# Patient Record
Sex: MALE | Race: BLACK OR AFRICAN AMERICAN | URBAN - METROPOLITAN AREA
[De-identification: names, ages, dates, MRNs, and addresses within clinical notes are randomized per-mention and may not be internally consistent; named-entity substitution may affect disease eponyms.]

---

## 2019-02-11 PROBLEM — I10 BENIGN ESSENTIAL HYPERTENSION: Status: ACTIVE | Noted: 2018-06-07

## 2019-02-11 PROBLEM — F14.10 COCAINE ABUSE (HCC): Status: ACTIVE | Noted: 2018-06-07

## 2019-02-11 PROBLEM — R63.4 UNEXPLAINED WEIGHT LOSS: Status: ACTIVE | Noted: 2018-06-08

## 2019-02-11 PROBLEM — F31.9 BIPOLAR DISORDER (HCC): Status: ACTIVE | Noted: 2017-12-21

## 2019-02-11 PROBLEM — F20.9 SCHIZOPHRENIA (HCC): Status: ACTIVE | Noted: 2017-12-21

## 2019-02-11 PROBLEM — E78.00 PURE HYPERCHOLESTEROLEMIA: Status: ACTIVE | Noted: 2018-06-07

## 2021-12-24 ENCOUNTER — HOME HEALTH ADMISSION (OUTPATIENT)
Dept: HOME HEALTH SERVICES | Facility: HOME HEALTH | Age: 66
End: 2021-12-24
Payer: MEDICARE

## 2021-12-26 ENCOUNTER — HOME CARE VISIT (OUTPATIENT)
Dept: SCHEDULING | Facility: HOME HEALTH | Age: 66
End: 2021-12-26
Payer: MEDICARE

## 2021-12-26 PROCEDURE — 3331090001 HH PPS REVENUE CREDIT

## 2021-12-26 PROCEDURE — 400013 HH SOC

## 2021-12-26 PROCEDURE — 3331090002 HH PPS REVENUE DEBIT

## 2021-12-26 PROCEDURE — 400018 HH-NO PAY CLAIM PROCEDURE

## 2021-12-26 PROCEDURE — G0299 HHS/HOSPICE OF RN EA 15 MIN: HCPCS

## 2021-12-26 NOTE — HOME HEALTH
Skilled Reason for admission/summary of clinical condition:  79-year old male who was recently hospitalized due to Prostate Cancer. Patient states that his legs and back hurt at times, uses Lidoderm patches to lower back daily and has Perocet or Tylenol for pain control. Patient stays in wheelchair most of the day. He states that he \"just wants to be able to walk and get around again. \"  Skilled nursing in for disease/medication management and education regarding Prostate Cancer-pain control, fall precautions, monitoring medication compliance. Patient had fall about a month ago when weakness started prior to going to hospital.    This patient is homebound for the following reasons Requires the assistance of 1 or more persons to leave the home  and Only leaves the home for medical reasons or [employer] services and are infrequent and of short duration for other reasons . Caregiver: Nephew and daughter. Caregiver assists with ADL's, medications, meal preparation, grocery shopping, transportation to MD appointments. Medications reconciled and all medications are not available in the home this visit. Patient's grandson to  prescriptions today. The following education was provided regarding medications, medication interactions, and look alike medications (specify): side effects, purposes, dosages, frequencies. Medications are effective at this time. High risk medication teaching regarding anticoagulants, hyperglycemic agents or opiod narcotics performed (specify) Percocet-pain medication-take only as prescribed, monitor for signs of dizziness/constipation/sedation that could occur with use, notify agency/MD if ineffective for pain. Home health supplies by type and quantity ordered/delivered this visit include: None needed.   Patient education provided this visit to include:   INSTRUCTED PATIENT AND CG THAT SHOULD ANY NEEDS OR CONCERNS ARISE TO FIRST CALL OUR OFFICE, OR THE DR'S OFFICE  OR GO TO AN URGENT CARE CENTER AND NOT TO THE ED FOR NON-LIFE THREATENING EVENTS. IF IT IS LIFE THREATENING THEN CALL 911 OR GO TO THE CLOSEST ER. Patient is a fall risk. Educated pateint to sit on the side of the chair/bed, take a slow deep breaths, have feet firmly planted before standing up, use cane/walker if available, or have someone to assist.  patient instructed to maintain clear pathways in home and to minimize clutter to prevent falls from occurring/minimize fall potential.  Pt/cg aware of disease process, s/s to monitor for, who to report to/when.  goals/teaching intiated. unable to assess progression of goals at this time due to being admission visit.  encouraged patient to get three nutritional meals daily and to stay hydrated, drink plenty of fluids. Patient/caregiver degree of understanding:fair  Home exercise program/Homework provided:   Pt/Caregiver instructed on plan of care and are agreeable to plan of care at this time. Physician Sol Moeller MD notified of patient admission to home health and plan of care including anticipated frequency of 2W1, 1W2, 2 PRN and treatments/interventions/modalities of disease/medication management and education regarding Prostate Cancer-pain control, fall precautions, monitoring medication compliance. Discharge planning discussed with patient and caregiver. Discharge planning as follows: Patient will be discharged once education has completed, patient is medically stable and pt/cg are able to independently manage medications and disease process. Pt/Caregiver did verbalize understanding of discharge planning. Next MD appointment TBD, patient's family to make appointment. Patient/caregiver encouraged/instructed to keep appointment as lack of follow through with physician appointment could result in discontinuation of home care services for non-compliance.     Prior to the visit, the patient was screened for the followin.) International travel in the last month: NO  2.) Exposure Screening:  Contact with anyone with the following symptoms in the last 14 days:    a.) Fever, or lower respiratory illness (shortness of breath or cough) or NO  b.) Fever with severe acute respiratory illness (pneumonia or acute respiratory distress syndrome) NO   3.) The patient has the following symptoms:  a.) Fever, cough, shortness of breath NO  If the patient has the above symptoms: The home health attending or hospice medical director will be contacted and scheduled patient visits will be placed on hold until the patient is confirmed to be positive or negative. An order will be obtained to hold visits until test results are confirmed. If negative, patient visits will resume. If positive, a delay in care order will be obtained as well as orders to restart care after the 14 day isolation period. During the 14 day isolation period the patient will be contacted by their primary  2 times a week to check on patient status and to make sure they have no questions about medications, disease process, and that they have had no changes in condition. The patient will be instructed to contact home health or hospice during the delay of care for any changes in conditions, questions, or concerns.

## 2021-12-27 ENCOUNTER — HOME CARE VISIT (OUTPATIENT)
Dept: HOME HEALTH SERVICES | Facility: HOME HEALTH | Age: 66
End: 2021-12-27
Payer: MEDICARE

## 2021-12-27 PROCEDURE — 3331090001 HH PPS REVENUE CREDIT

## 2021-12-27 PROCEDURE — 3331090002 HH PPS REVENUE DEBIT

## 2021-12-28 ENCOUNTER — HOME CARE VISIT (OUTPATIENT)
Dept: HOME HEALTH SERVICES | Facility: HOME HEALTH | Age: 66
End: 2021-12-28
Payer: MEDICARE

## 2021-12-28 VITALS
SYSTOLIC BLOOD PRESSURE: 116 MMHG | TEMPERATURE: 98 F | RESPIRATION RATE: 20 BRPM | OXYGEN SATURATION: 98 % | DIASTOLIC BLOOD PRESSURE: 66 MMHG | HEART RATE: 78 BPM

## 2021-12-28 PROCEDURE — 3331090001 HH PPS REVENUE CREDIT

## 2021-12-28 PROCEDURE — 3331090002 HH PPS REVENUE DEBIT

## 2021-12-28 NOTE — CASE COMMUNICATION
Hi Dr. Nasrin Nava,      I just wanted to make you aware that Mr. Carlee Nath has been admitted to home health care services with skilled nursing in for disease/medication management and education regarding Prostate Cancer-pain control, fall precautions, monitoring medication compliance. Patient did not have new medications at time of visit, but was going to  today. Thank you for allowing [facility] to care for your patient .     Thank you,  Farhan Florian RN

## 2021-12-29 ENCOUNTER — HOME CARE VISIT (OUTPATIENT)
Dept: SCHEDULING | Facility: HOME HEALTH | Age: 66
End: 2021-12-29
Payer: MEDICARE

## 2021-12-29 ENCOUNTER — HOME CARE VISIT (OUTPATIENT)
Dept: HOME HEALTH SERVICES | Facility: HOME HEALTH | Age: 66
End: 2021-12-29
Payer: MEDICARE

## 2021-12-29 VITALS
DIASTOLIC BLOOD PRESSURE: 62 MMHG | TEMPERATURE: 98.2 F | HEART RATE: 99 BPM | OXYGEN SATURATION: 98 % | SYSTOLIC BLOOD PRESSURE: 129 MMHG

## 2021-12-29 PROCEDURE — G0152 HHCP-SERV OF OT,EA 15 MIN: HCPCS

## 2021-12-29 PROCEDURE — G0155 HHCP-SVS OF CSW,EA 15 MIN: HCPCS

## 2021-12-29 PROCEDURE — 3331090002 HH PPS REVENUE DEBIT

## 2021-12-29 PROCEDURE — 3331090001 HH PPS REVENUE CREDIT

## 2021-12-29 NOTE — Clinical Note
Therapy Functional Score Assessment  Question                                            Score   Grooming                            2       Upper Dressing   2      Lower Dressing   2      Bathing                 5      Toilet Transfer                   4    Transfer                2            Ambulation                         4   Dyspnea                     2       Pain Interfering with activity        0  Est number therapy visits      5

## 2021-12-29 NOTE — HOME HEALTH
MSW provided resource information and documentation to pt and his nephew/caregiver Ashley Nicholas, who agreed to share information with the pt's daughter/payee Wendy Dhillon. Pt would benefit from additional care assistance as his primary caregiver is employed. MSW invited a return call if resource questions arise.

## 2021-12-29 NOTE — HOME HEALTH
Caregiver involvement: Pt lives with his nephew who assists with ADLs and IADLs. He is able to get around the house using a manual wheelchair but unable to ambulated since later October 2021. He has a tub shower that he is unable to use because of weakness in B LE inhibiting his ability to step into tub. Medications reconciled and all medications are available in the home this visit. The following education was provided regarding medications,: Continue as directed by MD.  Medications  are effective at this time. Patient education provided this visit: Educated on use of wheelchair brakes. Pt educated to lock breaks prior to completing transfers of seated ADL tasks like donning/doffing shoes for safety. Pt educated on clearing walk ways of clutter, throw rugs and securing marika to reduce fall hazards in the home. Pt and his nephew educated on tub shower bench option for increasing pt ability to showering in the tub shower unit safely. Patient level of understanding of education provided: Pt will continue to require reminders for safety with proper wheelchair break use with baseline cognitive delays. His nephew was present throughout and agreeable to provide safety cues. Pt and his nephew able to teach back benefits of tub transfer bench for increased independence with bathing. Patient response to treatment: Pt able to complete toilet transfer with CGA using counter for support. He expressed no fatigue when asked but presented with slight dyspnea after standing mobility. His legs buckled when attempting to left up high enough to clear tub and was unsafe for completion of tub transfer trial. He was able to deidra/doff his shoes including laces with cues for maintaining attention to tasks. He was able to lock his wheelchair breaks x2 without cues during transfers after initial verbal education.      Skilled Care Provided: Pt completed full occupational therapy assessment to include balance, coordination, strengthening, ADL education/training, functional mobility and home safety. Progress toward goals: Mr. Taylor Munoz presents with good  therapy potential to increase his endurance, balance, ROM, safety awareness and strength to increase pt independence with ADLs and functional mobility. He currently requires supervision to minimal assist with ADL tasks and compete assist with IADLS (cooking, cleaning, medication management. He presents with decreased standing balance and endurance for safe bathroom mobility. He is currently using a wheelchair in the home with walker to arrive this week. He has strong motivation to increase is independence with standing functional mobility. Pt and his nephew agreeable to continued occupational therapy services to address balance, endurance, B UE strength, ADLs, and functional mobility. Pt nephew reports pt to stay with him once discharged and not to boarding home. Home exercise program: Increase awareness of safety with locking breaks prior to functional activities. Pt to also be aware of fall hazards prior to completing transfers. CONTINUED NEED FOR THE FOLLOWING SKILLS: [facility] OT is medically necessary to address barriers of weakness, impaired balance,and decreased endurance. These barriers limit pt's participation in ADLs and functional mobility safely and would benefit from continued skilled OT to maximize independence and reduce burden on caregiver. Continued need for the following skills: Nursing, Physical Therapy, Occupational Therapy, Medical Social Work and [address] [facility]    The following discharge planning was discussed with the pt/caregiver: 1w5 B UE strengthen, bathing/dressing, balance and shower transfers. Pt to discharge with nephew    Gala Davey is a 60year old male with a past medical history of schizophrenia and metastatic prostate cancer with wide spread osseous metastatic disease.  The following information was obtained by the nephew at bedside and history: Ham Borrero was treated with palliative radiation 10/10 and androgen deprivation therapy was started in 10/2020. He was due to follow up outpatient for discussion of possible chemotherapy. However, he was lost as an outpatient to follow up. Per the nephew, Ham Borrero was placed in a boarding home and stopped following up. Per the nephew, he often refuses treatment of any kind. He does have a history of schizophrenia and is treated at [facility] with Anastasiia Murillo. Ham Borrero moved out of the boarding home in with his nephew approximately 1.5 months ago. Since that time his nephew has noticed a dramatic decline in his health. In particular over the last 3-5 days, Ham Borrero has been unable to ambulate. His nephew has to carry him to the chair and bathro om. He has also had some Incontinence of urine and bowel over the last four days. His nephew has noticed a cough and some dyspnea with exertion. Ham Borrero denies any pain at this time. He does endorse a cough. He is oriented to person and place only. Per the nephew, he has noticed Increased confusion at home. He is a previous smoker. He smoked cigarettes for 40+ years until he was unable to get out of the house approximately 1. 5 weeks ago.

## 2021-12-30 PROCEDURE — 3331090001 HH PPS REVENUE CREDIT

## 2021-12-30 PROCEDURE — 3331090002 HH PPS REVENUE DEBIT

## 2021-12-31 ENCOUNTER — HOME CARE VISIT (OUTPATIENT)
Dept: HOME HEALTH SERVICES | Facility: HOME HEALTH | Age: 66
End: 2021-12-31
Payer: MEDICARE

## 2021-12-31 ENCOUNTER — HOME CARE VISIT (OUTPATIENT)
Dept: SCHEDULING | Facility: HOME HEALTH | Age: 66
End: 2021-12-31
Payer: MEDICARE

## 2021-12-31 VITALS
OXYGEN SATURATION: 97 % | SYSTOLIC BLOOD PRESSURE: 148 MMHG | HEART RATE: 105 BPM | TEMPERATURE: 98.9 F | DIASTOLIC BLOOD PRESSURE: 88 MMHG | RESPIRATION RATE: 16 BRPM

## 2021-12-31 PROCEDURE — 3331090001 HH PPS REVENUE CREDIT

## 2021-12-31 PROCEDURE — 3331090002 HH PPS REVENUE DEBIT

## 2021-12-31 PROCEDURE — G0299 HHS/HOSPICE OF RN EA 15 MIN: HCPCS

## 2021-12-31 NOTE — HOME HEALTH
Skilled reason for visit: education regarding disease and medications     Caregiver involvement: family . Medications reviewed and all medications are not available in the home this visit. The following education was provided regarding medications, medication interactions, and look alike medications (specify): unable to find medications patient did not know where they where and CG was not present . Medications  are effective at this time.       Home health supplies by type and quantity ordered/delivered this visit include: none    Patient education provided this visit: SN educated on need to get medications and let nurse know what he is taking, making sure that he is rotating off his backside to help relieve pressure     Patient level of understanding of education provided: verbalized understanding but unable to teach back    Patient response to procedure performed:  no procedure     Patient's Progress towards personal goals: patient stated he wants to wlak again     Home exercise program: breathing techqnieus     Continued need for the following skills: Nursing, Physical Therapy and Occupational Therapy    Plan for next visit: education     Patient and/or caregiver notified and agrees to changes in the Plan of Care N/A      The following discharge planning was discussed with the pt/caregiver: when goals met and education is complete

## 2022-01-01 PROCEDURE — 3331090001 HH PPS REVENUE CREDIT

## 2022-01-01 PROCEDURE — 3331090002 HH PPS REVENUE DEBIT

## 2022-01-02 PROCEDURE — 3331090002 HH PPS REVENUE DEBIT

## 2022-01-02 PROCEDURE — 3331090001 HH PPS REVENUE CREDIT

## 2022-01-03 ENCOUNTER — HOME CARE VISIT (OUTPATIENT)
Dept: HOME HEALTH SERVICES | Facility: HOME HEALTH | Age: 67
End: 2022-01-03
Payer: MEDICARE

## 2022-01-03 ENCOUNTER — HOME CARE VISIT (OUTPATIENT)
Dept: SCHEDULING | Facility: HOME HEALTH | Age: 67
End: 2022-01-03
Payer: MEDICARE

## 2022-01-03 VITALS
OXYGEN SATURATION: 97 % | TEMPERATURE: 98 F | SYSTOLIC BLOOD PRESSURE: 110 MMHG | DIASTOLIC BLOOD PRESSURE: 70 MMHG | RESPIRATION RATE: 16 BRPM | HEART RATE: 95 BPM

## 2022-01-03 PROCEDURE — 3331090001 HH PPS REVENUE CREDIT

## 2022-01-03 PROCEDURE — 3331090002 HH PPS REVENUE DEBIT

## 2022-01-03 PROCEDURE — G0151 HHCP-SERV OF PT,EA 15 MIN: HCPCS

## 2022-01-03 NOTE — HOME HEALTH
PT INITIAL EVALUATION  Ingrid Ch is a 60year old male with a past medical history of schizophrenia and metastatic prostate cancer with wide spread osseous metastatic disease. The following information was obtained by the nephew at bedside and history: Jessie Joyce was treated with palliative radiation 10/10 and androgen deprivation therapy was started in 10/2020. He was due to follow up outpatient for discussion of possible chemotherapy. However, he was lost as an outpatient to follow up. Per the nephew, Jessie Joyce was placed in a boarding home and stopped following up. Per the nephew, he often refuses treatment of any kind. He does have a history of schizophrenia and is treated at [facility] with Lv Thurman. Jessie Joyce moved out of the boarding home in with his nephew approximately 1.5 months ago. Since that time his nephew has noticed a dramatic decline in his health. In particular over the last 3-5 days, Jessie Joyce has been unable to ambulate. His nephew has to carry him to the chair and bathro om. He has also had some Incontinence of urine and bowel over the last four days. His nephew has noticed a cough and some dyspnea with exertion. Jessie Joyce denies any pain at this time. He does endorse a cough. He is oriented to person and place only. Per the nephew, he has noticed Increased confusion at home. He is a previous smoker. He smoked cigarettes for 40+ years until he was unable to get out of the house approximately 1. 5 weeks ago. Hospital Course: This is a 60year old male with extensively metastatic prostate cancer presenting with generalized decline, weakness, and hypercalcemia. He was treated with calcitonin and IVF with correction of his hypercalcemia. He was seen by oncology and started on casodex. He is felt not to be a good candidate for any aggressive chemotherapy, nor does he want that. He will follow up with oncology in the office.      Past Medical Hx:   Weakness, Debility    Metastatic prostate CA, Hypercalcemia    Hypomagnesemia    FTT, Anorexia    Anemia    Schizophrenia    Medication Management: Nephew manages his medications  Social hx and home eval:  Pt lives in single story home with nephew. Caregiver Involvement: assists with ADL's, medical appointments  PLOF:  Pt PLOF is ambulation w no AD, pts base level of function independent with all ADL's  BALANCE:     Seated unsupported balance is good   Standing static balance is fair-  Standing dynamic balance is poor  Tinetti 13 /28    Patient is at risk for falls due to impaired balance, recent hospitalization  BLE Strength:  Left Hip flexion 3-/5 , hip abduction 3-/5, hip adduction 3-/5, Knee flexion 3/5  knee extension 3/5, ankle dorsiflexion 3/5  Right Hip flexion 3-/5 , hip abduction 3-/5, hip adduction 3-/5, Knee flexion 3/5  knee extension 3/5, ankle dorsiflexion 3/5  BLE ROM:  Right hip/knee/ankle: WFL  Left hip/knee/ankle: WFL  Bed mobility:  min A   Transfers:  mod A with sit<->stand for bed to chair, with PUW AD. min cues and instruction needed for safety and coordination  GAIT:  Patient ambulated  50 ft  with PUW  on level  surfaces min A. Pt demonstrates with decreased hip and knee flexion on BLE in pre and mid swing phase of gait as well as decreased stride-length and rich. Patient is unable to safely ambulate without assistance at this time. Pt required min cues for  safety and sequencing  Stairs: NT  Patient education provided this visit: Safety with functional mobility and instruction with HEP. Assessment: Referral for HHPT following recent hospitalization due to prostate cancer. HHPT is medically necessary to address the following clinical findings: decreased BLE strength and ROM, impaired gait, decreased I and safety with transfers and gait, decreased endurance, decreased balance and decreased safety in order to improve functional mobility and decrease fall risk. Pt is a fall risk as indicated by Tinetti score of 13/28. Patient will be seen for HHPT 3w1, 2w4, 1w1 for therapeutic exercises to increase BLE strength and ROM,  training for gait, stairs and transfers to increase I and safety with daily functional mobility and balance and endurance activities to decrease fall risk, decrease impairment and increase functional mobility and independence in the home. Pt. requires skilled PT intervention to instruct Pt. with gait training with LRAD, ROM and strengthening, stair training, transfer training, balance training, manual therapy, neuromuscular re-education, patient care-giver education, HEP, endurance training, body mechanics. Instructed patient with HEP for BLE/core strengthening and left HEP handout for the patient. Patient was able to return demonstrate the exercises given.   HEP includes:   Pt. received therapeutic exercises which included:  seated hip flexion/sit to stand/LAQ/ankle pumps

## 2022-01-04 ENCOUNTER — HOME CARE VISIT (OUTPATIENT)
Dept: HOME HEALTH SERVICES | Facility: HOME HEALTH | Age: 67
End: 2022-01-04
Payer: MEDICARE

## 2022-01-04 PROCEDURE — 3331090001 HH PPS REVENUE CREDIT

## 2022-01-04 PROCEDURE — 3331090002 HH PPS REVENUE DEBIT

## 2022-01-05 ENCOUNTER — HOME CARE VISIT (OUTPATIENT)
Dept: SCHEDULING | Facility: HOME HEALTH | Age: 67
End: 2022-01-05
Payer: MEDICARE

## 2022-01-05 ENCOUNTER — HOME CARE VISIT (OUTPATIENT)
Dept: HOME HEALTH SERVICES | Facility: HOME HEALTH | Age: 67
End: 2022-01-05
Payer: MEDICARE

## 2022-01-05 VITALS
HEART RATE: 94 BPM | OXYGEN SATURATION: 96 % | TEMPERATURE: 99.4 F | SYSTOLIC BLOOD PRESSURE: 118 MMHG | DIASTOLIC BLOOD PRESSURE: 60 MMHG

## 2022-01-05 VITALS
TEMPERATURE: 97 F | OXYGEN SATURATION: 97 % | SYSTOLIC BLOOD PRESSURE: 136 MMHG | RESPIRATION RATE: 18 BRPM | HEART RATE: 86 BPM | DIASTOLIC BLOOD PRESSURE: 78 MMHG

## 2022-01-05 VITALS
DIASTOLIC BLOOD PRESSURE: 67 MMHG | SYSTOLIC BLOOD PRESSURE: 108 MMHG | HEART RATE: 77 BPM | OXYGEN SATURATION: 97 % | TEMPERATURE: 97.7 F

## 2022-01-05 PROCEDURE — G0299 HHS/HOSPICE OF RN EA 15 MIN: HCPCS

## 2022-01-05 PROCEDURE — G0157 HHC PT ASSISTANT EA 15: HCPCS

## 2022-01-05 PROCEDURE — 3331090001 HH PPS REVENUE CREDIT

## 2022-01-05 PROCEDURE — G0152 HHCP-SERV OF OT,EA 15 MIN: HCPCS

## 2022-01-05 PROCEDURE — 3331090002 HH PPS REVENUE DEBIT

## 2022-01-05 NOTE — HOME HEALTH
S: Pt denies falls or changes in medication, no c/o pain  No cg participation   O: Transfers: CG - Min A for sit to stand transfers x 5 from [place] [facility] instruction provided for hand placement and body mechanics to improve transfers poor immediate standing balance   Therex/HEP: Seated ankle pumps, knee ext, marching, adduction with pillow between knees x 15 reps x 1 set demonstration/VCs to improve tech and slow rich   Balance: static standing unsupported x 30 sec x 3 attempts VCs to shift wt/posture to maintain balance  Gait: Pt amb 80ft with FWW on even surfaces pt amb with shuffling gait pattern decreaed hip and knee flexion decreased foot clearance VCs/instruction to increase heel strike and toe off to improve foot clearance   Education: Reviewed written HEP instructed to perform HEP 3x day, instructed in safety with use fo WC ie locking WC prior to sitting or standing, instructed to use RW at all times for ambulation. Response to Education: Pt able to return demonstrate HEP with improved tech, able to return demonstrate mechanics of WC and safety with standing, pt verbalizes understanding of improtance of daily participation in HEP   Response to tx: Pt tolerated tx without pain, c/o fatigue following tx, states he's is glad to be receiving PT and looking forward to next visit   A: Patient requires skilled PT for instruction in strengthening, balance and coordination to improve strength and facilitate increased independence with functional mobility.    P: Cont with PT, 3w1, 2w4, 1w1 for strengthening, gait tng, endurance tng and balance tng

## 2022-01-05 NOTE — HOME HEALTH
SUBJECTIVE: Pt reports he has received a standard walker and are waiting for rolling walker. Pt still has not received the tub transfer bench that was ordered. CAREGIVER INVOLVEMENT/ASSISTANCE NEEDED FOR: IADLs and functional mobility without wheelchair. HOME HEALTH SUPPLIES BY TYPE AND QUANTITY ORDERED/DELIVERED THIS VISIT INCLUDE: BN/A awaiting tub transfer bench    OBJECTIVE: See interventions    PATIENT RESPONSE TO TREATMENT:  Pt with increased fatigue during B BUE exercises and educated to take rest breaks between sets. He presented with decreased standing balance however improved when cued for proper foot placement to promote an increased base of support. Pt increased ability to simulate lower body dressing when standing with a larger base of support. PATIENT LEVEL OF UNDERSTANDING OF EDUCATION PROVIDED: Pt required continued cues to use wide base of support when standing. He required cues throughout for pacing exercises to reduce over fatigue. Pt able to teach back and demonstrate BUE exercises once demonstrated and provided with visuals. .    ASSESSMENT OF PROGRESS TOWARD GOALS: Continue to progress towards ADLs, balance, functional mobility and B BUE strengthening HEP    CONTINUED NEED FOR THE FOLLOWING SKILLS: Due to reduced functional activity tolerance, BUE weakness, functional mobility, balance, and ADL function, pt would benefit from OT [facility] services in order to maximize safety and independence in home environment. PLAN FOR NEXT VISIT: Progress bathroom mobility to tub and toilet. Continue to practice standing balance with use of walker. THE FOLLOWING DISCHARGE PLANNING WAS DISCUSSED WITH THE PATIENT/CAREGIVER: 1w3 with plan to dc to home environment once [facility] OT goals have been met or has met max potential. D/c planned for week of 1/23/2022.

## 2022-01-06 PROCEDURE — 3331090001 HH PPS REVENUE CREDIT

## 2022-01-06 PROCEDURE — 3331090002 HH PPS REVENUE DEBIT

## 2022-01-06 NOTE — HOME HEALTH
Skilled reason for visit: assessment, teaching disease and medication management. Medications reviewed all listed medications are at home. The following education was provided regarding medications, medication interactions, and look a like medications: N/A all med's reviewed. Discussed importance of compliance, timely taking all prescribed med's, proper dosage and freq. Medications are effective at this time. Currently pt has no PCP, SN spoke to CG/daughter and sister in law to choose PCP and make an appt for pt. Also discussed pt needs to have a PCP so he could continue his daily medications and routine physical check up. (Pt no available medication Zyprexa and Lithium). Sister in law and [place]/ has good understanding and will call for PCP. Caregiver: caregiver/sister in law, daughter is available to assist with daily meals, administer with daily medications, run errands, groceries and accompany to MD appt. Skilled care provided: Teaching disease and medication management and completed assessment. Pt V/S WNR to pt. and no S/S of infection. Patient education provided this visit to include: Reviewed hand washing, wearing mask during clinician visit and avoiding sick person. Safety transfer to [facility], avoid getting up too quickly when feeling dizzy or weak. Getting OOB, avoid constant laying down and repositioning q 2 hrs while in bed. Pt to call for assistance prn. Continue PT exercises as recommended. Continue heart healthy diet, avoiding foods with high NA contents, avoid skipping meals, sm freq meals with less appetite, and good hydration. Reviewed S/S of infection; fever 100.4, increase pain, swelling, coughing with yellow thick sputum, cloudy urine with strong odor, not feeling well 2-3 days, SOB and to call [facility] or MD for assistance if experiencing any of these S/S. To call 911 with chest pains, facial drooping, difficulty talking, non arousable/unconscious and uncontrollable bleeding. Patient/caregiver degree of understanding: good   Patient response to procedure performed:  N/A  Agency Progress toward goals: On tract. Patient's Progress towards personal goals: Progressing towards goals. CG's had dood understanding with teaching instruction, needs to continue to monitor compliance with daily medications. Home exercise program/Homework provided: PT exercises, HEP deep breathing exercises 10x when having SOB, pain and anxiety. Continued need for the following skills: SN, OT, PT  Plan for next visit: Teaching disease and medication management, physical assessment  Discharge planning discussed with patient and caregiver. Discharge planning as follows: Pt/CG will be able to manage disease and medication independently, health condition stable and goals met. Pt/Caregiver did verbalized understanding of discharge planning.                   COVID - 23 Screening completed before visit:     Denies and no family member  has any of these S/S:  Fever, dry cough, chills, sore throat diarrhea, body aches, not feeling well and loss of taste

## 2022-01-07 ENCOUNTER — HOME CARE VISIT (OUTPATIENT)
Dept: SCHEDULING | Facility: HOME HEALTH | Age: 67
End: 2022-01-07
Payer: MEDICARE

## 2022-01-07 ENCOUNTER — HOME CARE VISIT (OUTPATIENT)
Dept: HOME HEALTH SERVICES | Facility: HOME HEALTH | Age: 67
End: 2022-01-07
Payer: MEDICARE

## 2022-01-07 PROCEDURE — 3331090001 HH PPS REVENUE CREDIT

## 2022-01-07 PROCEDURE — 3331090002 HH PPS REVENUE DEBIT

## 2022-01-07 PROCEDURE — G0157 HHC PT ASSISTANT EA 15: HCPCS

## 2022-01-08 VITALS
OXYGEN SATURATION: 95 % | DIASTOLIC BLOOD PRESSURE: 64 MMHG | TEMPERATURE: 98.9 F | SYSTOLIC BLOOD PRESSURE: 118 MMHG | HEART RATE: 100 BPM

## 2022-01-08 PROCEDURE — 3331090002 HH PPS REVENUE DEBIT

## 2022-01-08 PROCEDURE — 3331090001 HH PPS REVENUE CREDIT

## 2022-01-08 NOTE — HOME HEALTH
S: Pt denies falls or changes in medication, no c/o pain  No cg present during tx session pt requires Assitance with ADLs and IADs   O: Transfers: CG - Min A for sit to stand transfers x 5 from [facility] decreaed VCs for hand placment   Bed mobility: Instruction for bed mobility sit<>supine transfers pt requires Min A for transfers to manage LEs, pt able to roll right/left with CGA Transfers: Pt instructed in Bed<>WC transfers Min A provided improved hand placment and body mechanics following tng   Therex/HEP: Seated ankle pumps, knee ext, marching, adduction with pillow between knees x 15 reps x 1 set demonstration/VCs to improve tech and slow rich pt able to return demonstrate HEP   Stairs:  Pt instructed in stair negotiation Mod A provided VCs for hand placement and sequencing   Gait: Pt amb 80ft with FWW on even and uneven surfaces Min-Mod A provided pt amb with decreased rich, stride length, hip and knee flexion, increased scissoring VCs to widen base of support and to increaed heel strike to improved foot clearance. Decreased scissoing following tng   minimal change in bed mobility following tng, decreased scissoring following gait tng, improved transfers followig tng, pt presents with generalized weakness  Education: instructed to perform HEP 3x day use AD at all times walk only with assistance     Response to Education: Pt able to return demonstrate HEP with improved tech, pt verbalizes understanding of improtance of daily participation in HEP and use of FWW for amb with assistance of caregivers  Response to tx: Pt tolerated tx without pain, c/o fatigue 6/10 following   A: Patient requires skilled PT for instruction in strengthening, balance and coordination to improve strength and facilitate increased independence with functional mobility.    P: Cont with PT, 3w1, 2w4, 1w1 for strengthening, gait tng, endurance tng and balance tng

## 2022-01-09 PROCEDURE — 3331090002 HH PPS REVENUE DEBIT

## 2022-01-09 PROCEDURE — 3331090001 HH PPS REVENUE CREDIT

## 2022-01-10 PROCEDURE — 3331090002 HH PPS REVENUE DEBIT

## 2022-01-10 PROCEDURE — 3331090001 HH PPS REVENUE CREDIT

## 2022-01-11 ENCOUNTER — HOME CARE VISIT (OUTPATIENT)
Dept: SCHEDULING | Facility: HOME HEALTH | Age: 67
End: 2022-01-11
Payer: MEDICARE

## 2022-01-11 ENCOUNTER — HOME CARE VISIT (OUTPATIENT)
Dept: HOME HEALTH SERVICES | Facility: HOME HEALTH | Age: 67
End: 2022-01-11
Payer: MEDICARE

## 2022-01-11 PROCEDURE — 3331090002 HH PPS REVENUE DEBIT

## 2022-01-11 PROCEDURE — 3331090001 HH PPS REVENUE CREDIT

## 2022-01-12 PROCEDURE — 3331090001 HH PPS REVENUE CREDIT

## 2022-01-12 PROCEDURE — 3331090002 HH PPS REVENUE DEBIT

## 2022-01-13 ENCOUNTER — HOME CARE VISIT (OUTPATIENT)
Dept: HOME HEALTH SERVICES | Facility: HOME HEALTH | Age: 67
End: 2022-01-13
Payer: MEDICARE

## 2022-01-13 ENCOUNTER — HOME CARE VISIT (OUTPATIENT)
Dept: SCHEDULING | Facility: HOME HEALTH | Age: 67
End: 2022-01-13
Payer: MEDICARE

## 2022-01-13 VITALS
HEART RATE: 99 BPM | OXYGEN SATURATION: 95 % | DIASTOLIC BLOOD PRESSURE: 61 MMHG | TEMPERATURE: 99.5 F | SYSTOLIC BLOOD PRESSURE: 108 MMHG

## 2022-01-13 PROCEDURE — 3331090002 HH PPS REVENUE DEBIT

## 2022-01-13 PROCEDURE — G0158 HHC OT ASSISTANT EA 15: HCPCS

## 2022-01-13 PROCEDURE — 3331090001 HH PPS REVENUE CREDIT

## 2022-01-14 PROCEDURE — 3331090001 HH PPS REVENUE CREDIT

## 2022-01-14 PROCEDURE — 3331090002 HH PPS REVENUE DEBIT

## 2022-01-14 NOTE — HOME HEALTH
SUBJECTIVE: Pt asleep in chair upon arrival. Pt lethargic upon arrival     PRESENTATION: Client Present sitting upright in chair. CLIENT/CGR CONFIRM: No ED visits, No falls, No Medical Insurance changes reported, and No medication changes reported     CG involvement includes: Pt nephew provides assistance with ADLs/IADLs     ____________________________   OBJECTIVE  see interventions  . Patient education provided this visit: Pt and CG educated on energy conservation techniques. See interventions  Patient level of understanding of education provided: Pt cg able to verbalize understanding of energy conservation techniques  Home exercise program: B UE strengthening against gravity for shoulder flexion/extension, overhead press, chest press, shoulder ab/adduction and elbow flexion/extension  ________________________________    ASSESSMENT / Justification for continued TX or discharge:     ASSESSMENT AND PROGRESS TOWARD GOALS: Pt demo G ability to follow HEP ind. Pt demo 4/5 strength in LUE and limited gross motor control needed for completion of ADLs and functional transfers.  Continued OT needed for ADL training, IADL training,functional transfer training  Patient response to treatment:  Pt able to follow HEP and reports feeling like he has more energy  ________________________________      PLAN FOR NEXT VISIT: training with use of adaptive equipment    DISCHARGE PLANNING:  The following discharge planning was discussed with the pt/caregiver: Discharge to self and family under MD supervision once all goals have been met or patient has reached maximum potential.  Frequency remaining: 2X1

## 2022-01-15 PROCEDURE — 3331090002 HH PPS REVENUE DEBIT

## 2022-01-15 PROCEDURE — 3331090001 HH PPS REVENUE CREDIT

## 2022-01-16 PROCEDURE — 3331090002 HH PPS REVENUE DEBIT

## 2022-01-16 PROCEDURE — 3331090001 HH PPS REVENUE CREDIT

## 2022-01-17 PROCEDURE — 3331090002 HH PPS REVENUE DEBIT

## 2022-01-17 PROCEDURE — 3331090001 HH PPS REVENUE CREDIT

## 2022-01-18 ENCOUNTER — HOME CARE VISIT (OUTPATIENT)
Dept: SCHEDULING | Facility: HOME HEALTH | Age: 67
End: 2022-01-18
Payer: MEDICARE

## 2022-01-18 PROCEDURE — 3331090001 HH PPS REVENUE CREDIT

## 2022-01-18 PROCEDURE — 3331090002 HH PPS REVENUE DEBIT

## 2022-01-19 ENCOUNTER — HOME CARE VISIT (OUTPATIENT)
Dept: SCHEDULING | Facility: HOME HEALTH | Age: 67
End: 2022-01-19
Payer: MEDICARE

## 2022-01-19 PROCEDURE — 3331090002 HH PPS REVENUE DEBIT

## 2022-01-19 PROCEDURE — 3331090001 HH PPS REVENUE CREDIT

## 2022-01-20 ENCOUNTER — HOME CARE VISIT (OUTPATIENT)
Dept: SCHEDULING | Facility: HOME HEALTH | Age: 67
End: 2022-01-20
Payer: MEDICARE

## 2022-01-20 PROCEDURE — G0157 HHC PT ASSISTANT EA 15: HCPCS

## 2022-01-20 PROCEDURE — 3331090002 HH PPS REVENUE DEBIT

## 2022-01-20 PROCEDURE — 3331090001 HH PPS REVENUE CREDIT

## 2022-01-20 NOTE — CASE COMMUNICATION
The visit was missed due to the following reason(s): no show, no call; arrived at agreed time, no answer after door bell and knock several times; also called, no answer.

## 2022-01-20 NOTE — HOME HEALTH
The visit was missed due to the following reason(s): no show, no call; arrived at patient home at agreed upon time, no answer at door after doorbell and knock several times, also called, no answer.

## 2022-01-21 ENCOUNTER — HOME CARE VISIT (OUTPATIENT)
Dept: SCHEDULING | Facility: HOME HEALTH | Age: 67
End: 2022-01-21
Payer: MEDICARE

## 2022-01-21 VITALS
DIASTOLIC BLOOD PRESSURE: 61 MMHG | SYSTOLIC BLOOD PRESSURE: 115 MMHG | HEART RATE: 102 BPM | OXYGEN SATURATION: 98 % | TEMPERATURE: 98.3 F

## 2022-01-21 PROCEDURE — 3331090002 HH PPS REVENUE DEBIT

## 2022-01-21 PROCEDURE — 3331090001 HH PPS REVENUE CREDIT

## 2022-01-21 NOTE — HOME HEALTH
S: No changes in medication Antonia Ano states pt had a fall this morning around 3:30am states he heard a thump and found pt on the floor, pt states he was attempting to get into his WC patient fell onto his knees reports 10/10 pain in bilateral LEs pt thought he was awake but was dreaming when he attempted to get out of bed, pt complaints of tightness in right side groin area and abdominal area although pt is not complaining of pain in groin area at this time caregiver states pt was unable to move RLE yesterday due to groin pain. Caregiver Mr Toya Kendall is present during tx sesson he assists pt with all ADLs, Medication management. Pt is out of pain meds and does not have a PCP at this time Dr Elisa Bragg has left the practice pt has new PCP but is not scheduled to be seen until March. Pt and caregiver instructed in safety pt instructed to ask for assistance for all transfers and mobility. Discussed need for bed rails, pt may benefit from hospital bed.       A: Pt unable to tolerate tx today due to elevated pain leve, pt may benefit from hospice continues to present with decreased strength, unsteady gait, poor endurance   P: PT will perform Post Fall Assessment next visit

## 2022-01-22 PROCEDURE — 3331090001 HH PPS REVENUE CREDIT

## 2022-01-22 PROCEDURE — 3331090002 HH PPS REVENUE DEBIT

## 2022-01-23 PROCEDURE — 3331090002 HH PPS REVENUE DEBIT

## 2022-01-23 PROCEDURE — 3331090001 HH PPS REVENUE CREDIT

## 2022-01-24 PROCEDURE — 3331090002 HH PPS REVENUE DEBIT

## 2022-01-24 PROCEDURE — 3331090001 HH PPS REVENUE CREDIT

## 2022-01-25 ENCOUNTER — HOME CARE VISIT (OUTPATIENT)
Dept: HOME HEALTH SERVICES | Facility: HOME HEALTH | Age: 67
End: 2022-01-25
Payer: MEDICARE

## 2022-01-25 PROCEDURE — 3331090002 HH PPS REVENUE DEBIT

## 2022-01-25 PROCEDURE — 400018 HH-NO PAY CLAIM PROCEDURE

## 2022-01-25 PROCEDURE — 3331090001 HH PPS REVENUE CREDIT

## 2022-01-26 ENCOUNTER — HOME CARE VISIT (OUTPATIENT)
Dept: SCHEDULING | Facility: HOME HEALTH | Age: 67
End: 2022-01-26
Payer: MEDICARE

## 2022-01-26 VITALS
HEART RATE: 104 BPM | SYSTOLIC BLOOD PRESSURE: 108 MMHG | DIASTOLIC BLOOD PRESSURE: 68 MMHG | RESPIRATION RATE: 16 BRPM | OXYGEN SATURATION: 94 % | TEMPERATURE: 98.7 F

## 2022-01-26 PROCEDURE — 3331090002 HH PPS REVENUE DEBIT

## 2022-01-26 PROCEDURE — 400013 HH SOC

## 2022-01-26 PROCEDURE — G0151 HHCP-SERV OF PT,EA 15 MIN: HCPCS

## 2022-01-26 PROCEDURE — 3331090001 HH PPS REVENUE CREDIT

## 2022-01-26 NOTE — Clinical Note
Mr. Jayant Barrios has been clinically discharged and documentation finalized for completion of PT discharge. Caregiver involvement: Pt nephew is primary caregiver at this time and has been assisting with ADL's, functional mobility and medication management  Medications reconciled and all medications are available in the home this visit. The following education was provided regarding medications, medication interactions, and look a like medications: NA  Medications  are effective at this time. Home health supplies by type and quantity ordered/delivered this visit include: NA  Patient education provided this visit: safety with functional mobility  Current Functional Status and progress towards goals:  Strength: Pt. BLE strength is 3-/5 which is no improvement from the initial evaluation strength of 3-/5. BED MOBILITY: Pt. is min A with all bed mobility  which demonstrates no improvement from the initial evaluation of min A.   TRANSFERS: Pt. is mod A with all transfers which demonstrates no improvement from the initial evaluation score of mod A. GAIT/WC MOBILITY: Pt. is unable to ambulate secondary to fall and increased pelvic pain. Pt. has been to ED and had no hip fx. per nephew report. This may be a progression of his prostate cancer and he is scheduled to see an MD, March 3, 2021. This represents an decline from the initial evaluation of 50 feet with PUW AD on level surfaces with min A. BALANCE: Patient's final tinetti is unable to assess which is a decline from the initial evaluation score of 13/28. This allows the patient to be functionally more independent and have a decreased fall risk  Progress toward goals: Patient has met all goals, see interventions for details. Patient level of understanding of education provided: good  Patient response to treatment:  no treatment today as he is being discharged. Home exercise program: Patient has been given a HEP and patient/caregiver understand the HEP. Patient is doing the HEP 1/day as able  Continued need for the following skills:  NA patient is final DC from PT today   The following discharge planning was discussed with the pt/caregiver: DC from PT    Thank you for your referral of this patient.     Sincerely,    Meli Schroeder, MPT  Physical Therapist

## 2022-01-27 PROCEDURE — 3331090002 HH PPS REVENUE DEBIT

## 2022-01-27 PROCEDURE — 3331090001 HH PPS REVENUE CREDIT

## 2022-01-27 NOTE — HOME HEALTH
DC ACTIONS NARRATIVE  Pt. clinically discharged and documentation finalized for completion of PT discharge. Caregiver involvement: Pt nephew is primary caregiver at this time and has been assisting with ADL's, functional mobility and medication management  Medications reconciled and all medications are available in the home this visit. The following education was provided regarding medications, medication interactions, and look a like medications: NA  Medications  are effective at this time. Home health supplies by type and quantity ordered/delivered this visit include: NA  Patient education provided this visit: safety with functional mobility  Current Functional Status and progress towards goals:  Strength: Pt. BLE strength is 3-/5 which is no improvement from the initial evaluation strength of 3-/5. BED MOBILITY: Pt. is min A with all bed mobility  which demonstrates no improvement from the initial evaluation of min A.   TRANSFERS: Pt. is mod/max A with all transfers which demonstrates no improvement from the initial evaluation score of mod A. GAIT/WC MOBILITY: Pt. is unable to ambulate secondary to fall and increased pelvic pain. Pt. has been to ED and had no hip fx. per nephew report. This may be a progression of his prostate cancer and he is scheduled to see an MD, March 3, 2021. This represents an decline from the initial evaluation of 50 feet with PUW AD on level surfaces with min A. BALANCE: Patient's final tinetti is unable to assess which is a decline from the initial evaluation score of 13/28. This allows the patient to be functionally more independent and have a decreased fall risk  Progress toward goals: Patient has met all goals, see interventions for details. Patient level of understanding of education provided: good  Patient response to treatment:  no treatment today as he is being discharged.   Home exercise program: Patient has been given a HEP and patient/caregiver understand the HEP. Patient is doing the HEP 1/day as able  Continued need for the following skills:  NA patient is final DC from PT today   The following discharge planning was discussed with the pt/caregiver: DC from PT      POST FALL:   Date and Time of Fall: 1/19/22  3:30 am  SOC/MICHAEL Date: 12/26/21  Fall observed by [facility] Staff? no  Describe Event and Document any re-training or treatment plan modifications indicated:  pt tried to get OOB by himself and fell to floor  Response to re-training or treatment plan modifications: Pt. went to ED at [facility] to r/o and fx. No fx per nephew. Pt. has MD appointment on 3/3/22   Assisted Devices used by patient prior to fall: FWW  Was equipment in use at time of fall? (Yes / No) - NO  Injury (Yes / No), If yes, describe:  no injury per nephew and [facility] ED. However patient has increased pain in pelvis, possibly d/t pancreatic cancer  Emergent Care Received: (Yes / No), if yes, describe: pt. went to ED to r/o fracture  Was patient identified as High Risk for falls? (Yes / No) - yes  List Tests Performed, Scores of Tests, and Patient Risk Factors: tinetti test  MD Notified (name and time): no primary MD available.  Pt was seen at ED by MD

## 2022-01-28 ENCOUNTER — HOME CARE VISIT (OUTPATIENT)
Dept: SCHEDULING | Facility: HOME HEALTH | Age: 67
End: 2022-01-28
Payer: MEDICARE

## 2022-01-28 VITALS
OXYGEN SATURATION: 100 % | HEART RATE: 96 BPM | DIASTOLIC BLOOD PRESSURE: 56 MMHG | TEMPERATURE: 97.7 F | SYSTOLIC BLOOD PRESSURE: 115 MMHG

## 2022-01-28 PROCEDURE — 3331090001 HH PPS REVENUE CREDIT

## 2022-01-28 PROCEDURE — G0152 HHCP-SERV OF OT,EA 15 MIN: HCPCS

## 2022-01-28 PROCEDURE — 3331090002 HH PPS REVENUE DEBIT

## 2022-01-28 NOTE — Clinical Note
Occupational Therapy  Mr. Sydnee Harrell has had [id number] skilled visits prior to his discharge. Pt nephew is requesting to discontinue HHOT services to focus on pt comfort and pain concerns. Pt was unable to meet all of his therapy goals. He requires minimal assist for upper body bathing and moderate for lower body bathing. Pt requires minimal assist for simulated lower body dressing. Caregiver reports minimal assist for toileting tasks. Pt standing balance continues to be fair- with pt using FWW for support and primarily wheelchair for household mobility. Pt requires CGA/SBA for sit to stand transfers and was unable to trial tub transfer however tub transfer bench was ordered. Pt able to complete BUE HEP with handout use however not consistent and completed on a daily basis. Pt and his nephew report utilizing some energy conservation techniques in daily routine (sitting when dressing, plan ahead to avoid rushing and complete tasks in sitting). However, pt is unable to independently recall specific energy conservation techniques and requires handout assist. A full medication reconciliation was completed with no concerns. Pt to discharge home with his nephew present for assistance. Pt MD notified of pt discharge from [facility] and home health services.

## 2022-01-28 NOTE — HOME HEALTH
Emergency Preparedness: Patient/Caregiver Instructed in the following:    Have one gallon of water per person for at least 3 days on hand. Have non-perishable food for at least 3 days that do not need to be cooked. Have flashlights and batteries  Charge your cell phones & any back up lithium batteries for your cell phones. Have 3+ days of back up oxygen in your home if applicable. Have a phone in your home that is hard wired & does not require power. Have medication for a week in your home. Make sure you have a caregiver in the home to provide care in case your home health/hospice nurse cannot get to your house. Make sure you have all of your paperwork, I.e. id, insurance cards, DME phone number, DR & pharmacy phone numbers, agency phone number, medication in place for easy access & in zip lock bag for protection.